# Patient Record
Sex: FEMALE | Race: OTHER
[De-identification: names, ages, dates, MRNs, and addresses within clinical notes are randomized per-mention and may not be internally consistent; named-entity substitution may affect disease eponyms.]

---

## 2018-11-28 ENCOUNTER — TRANSCRIPTION ENCOUNTER (OUTPATIENT)
Age: 38
End: 2018-11-28

## 2019-01-15 ENCOUNTER — TRANSCRIPTION ENCOUNTER (OUTPATIENT)
Age: 39
End: 2019-01-15

## 2022-06-07 PROBLEM — Z00.00 ENCOUNTER FOR PREVENTIVE HEALTH EXAMINATION: Status: ACTIVE | Noted: 2022-06-07

## 2022-06-23 ENCOUNTER — NON-APPOINTMENT (OUTPATIENT)
Age: 42
End: 2022-06-23

## 2022-06-23 ENCOUNTER — APPOINTMENT (OUTPATIENT)
Dept: HEART AND VASCULAR | Facility: CLINIC | Age: 42
End: 2022-06-23
Payer: COMMERCIAL

## 2022-06-23 DIAGNOSIS — J45.909 UNSPECIFIED ASTHMA, UNCOMPLICATED: ICD-10-CM

## 2022-06-23 DIAGNOSIS — K21.9 GASTRO-ESOPHAGEAL REFLUX DISEASE W/OUT ESOPHAGITIS: ICD-10-CM

## 2022-06-23 PROCEDURE — 93000 ELECTROCARDIOGRAM COMPLETE: CPT

## 2022-06-23 PROCEDURE — 99204 OFFICE O/P NEW MOD 45 MIN: CPT

## 2022-06-26 VITALS — SYSTOLIC BLOOD PRESSURE: 124 MMHG | RESPIRATION RATE: 16 BRPM | HEART RATE: 68 BPM | DIASTOLIC BLOOD PRESSURE: 84 MMHG

## 2022-06-26 PROBLEM — K21.9 GERD (GASTROESOPHAGEAL REFLUX DISEASE): Status: ACTIVE | Noted: 2022-06-26

## 2022-06-26 PROBLEM — J45.909 ASTHMA: Status: ACTIVE | Noted: 2022-06-26

## 2022-06-26 RX ORDER — ALBUTEROL SULFATE 5 MG/ML
SOLUTION, NON-ORAL INHALATION
Refills: 0 | Status: ACTIVE | COMMUNITY

## 2022-06-26 RX ORDER — DICLOFENAC SODIUM 100 MG/1
100 TABLET, FILM COATED, EXTENDED RELEASE ORAL
Refills: 0 | Status: ACTIVE | COMMUNITY

## 2022-06-26 NOTE — DISCUSSION/SUMMARY
[FreeTextEntry1] : EKG: NSR, TWI V1-4, no change from EKG dated 6/3/22\par \par Impression: Atypical chest pain - probably secondary to a radiculopathy given her spinal issues. Her abnormal EKG is of no clinical significance. No further cardiac work up or follow up indicated at this time.  She is to follow up with her PMD and neurologist.

## 2022-06-26 NOTE — REASON FOR VISIT
[Symptom and Test Evaluation] : symptom and test evaluation [FreeTextEntry1] : This 42 year old woman without prior cardiac history comes in c/o new onset constant chest pain and abnormal EKG. She reports constant, non-exertional SSCP. There is no pleuritic component and no change with palpation or position.  It has been persistent for 2 months. There is no associated SOB, nausea, palpitations or syncope. Her EKG at her PMD revealed TWI V1-4. She has had multiple orthopedic issues including spinal compression.

## 2022-07-29 ENCOUNTER — NON-APPOINTMENT (OUTPATIENT)
Age: 42
End: 2022-07-29

## 2022-08-09 ENCOUNTER — APPOINTMENT (OUTPATIENT)
Dept: HEART AND VASCULAR | Facility: CLINIC | Age: 42
End: 2022-08-09

## 2022-08-09 ENCOUNTER — NON-APPOINTMENT (OUTPATIENT)
Age: 42
End: 2022-08-09

## 2022-08-09 VITALS
BODY MASS INDEX: 26.68 KG/M2 | WEIGHT: 170 LBS | HEART RATE: 82 BPM | TEMPERATURE: 97.5 F | OXYGEN SATURATION: 98 % | DIASTOLIC BLOOD PRESSURE: 65 MMHG | RESPIRATION RATE: 16 BRPM | HEIGHT: 67 IN | SYSTOLIC BLOOD PRESSURE: 117 MMHG

## 2022-08-09 DIAGNOSIS — Z87.09 PERSONAL HISTORY OF OTHER DISEASES OF THE RESPIRATORY SYSTEM: ICD-10-CM

## 2022-08-09 DIAGNOSIS — Z82.49 FAMILY HISTORY OF ISCHEMIC HEART DISEASE AND OTHER DISEASES OF THE CIRCULATORY SYSTEM: ICD-10-CM

## 2022-08-09 DIAGNOSIS — R53.83 OTHER FATIGUE: ICD-10-CM

## 2022-08-09 DIAGNOSIS — G57.11 MERALGIA PARESTHETICA, RIGHT LOWER LIMB: ICD-10-CM

## 2022-08-09 DIAGNOSIS — Z86.79 PERSONAL HISTORY OF OTHER DISEASES OF THE CIRCULATORY SYSTEM: ICD-10-CM

## 2022-08-09 DIAGNOSIS — Z82.0 FAMILY HISTORY OF EPILEPSY AND OTHER DISEASES OF THE NERVOUS SYSTEM: ICD-10-CM

## 2022-08-09 DIAGNOSIS — Z86.69 PERSONAL HISTORY OF OTHER DISEASES OF THE NERVOUS SYSTEM AND SENSE ORGANS: ICD-10-CM

## 2022-08-09 DIAGNOSIS — Z86.32 PERSONAL HISTORY OF GESTATIONAL DIABETES: ICD-10-CM

## 2022-08-09 DIAGNOSIS — M51.26 OTHER INTERVERTEBRAL DISC DISPLACEMENT, LUMBAR REGION: ICD-10-CM

## 2022-08-09 PROCEDURE — 99215 OFFICE O/P EST HI 40 MIN: CPT | Mod: 25

## 2022-08-09 PROCEDURE — 93246 EXT ECG>7D<15D RECORDING: CPT

## 2022-08-09 PROCEDURE — 93000 ELECTROCARDIOGRAM COMPLETE: CPT

## 2022-08-09 PROCEDURE — 99205 OFFICE O/P NEW HI 60 MIN: CPT | Mod: 25

## 2022-08-10 ENCOUNTER — NON-APPOINTMENT (OUTPATIENT)
Age: 42
End: 2022-08-10

## 2022-08-10 PROBLEM — R53.83 OTHER FATIGUE: Status: ACTIVE | Noted: 2022-08-10

## 2022-08-10 NOTE — HISTORY OF PRESENT ILLNESS
[FreeTextEntry1] : Lynn Bolaños is a 41 yo female who presents for CV evaluation.  She has been seen by Dr Miguel Archuleta in the past.\par \par For two months, she has been experiencing an intermittent, deep, R and mid chest chest discomfort, lasting 10min to an hour, at least once a day, occurring in the am or pm, preceded by a brief sensation of palps.  She denies any associated symptoms of diaphoresis, n/v.  She has some fatigue and mild SOB.  She denies pnd, orthopnea, edema, or loc.\par \par She is active.  She is compliant with meds.\par \par ECG today reveals NSR, NS ST T changes, consider ischemia.\par \par Clinical hx reviewed in detail.\par \par Recommendations:\par 1. collect records from [facility], primary care\par 2. EXSE\par 3. CPET\par 4. ZIO\par 5. reassurance provided\par

## 2022-08-10 NOTE — DISCUSSION/SUMMARY
[ECG Normal Variant] : ECG normal variant [Myocardial Ischemia] : myocardial ischemia [Stable] : stable [Possible Cardiac Ischemia (Intermd Prob)] : possible cardiac ischemia (intermediate probability) [Non-Cardiac] : non-cardiac chest pain [Rhythm Disorder] : rhythm disorder [Deteriorating] : deteriorating [None] : There are no changes in medication management [Patient] : the patient [FreeTextEntry1] : fatigue [EKG obtained to assist in diagnosis and management of assessed problem(s)] : EKG obtained to assist in diagnosis and management of assessed problem(s)

## 2022-08-10 NOTE — REASON FOR VISIT
[Symptom and Test Evaluation] : symptom and test evaluation [Arrhythmia/ECG Abnorrmalities] : arrhythmia/ECG abnormalities [FreeTextEntry3] : Prem Walters

## 2022-08-22 PROCEDURE — 93248 EXT ECG>7D<15D REV&INTERPJ: CPT

## 2022-08-30 ENCOUNTER — APPOINTMENT (OUTPATIENT)
Dept: UROLOGY | Facility: CLINIC | Age: 42
End: 2022-08-30

## 2022-08-31 ENCOUNTER — NON-APPOINTMENT (OUTPATIENT)
Age: 42
End: 2022-08-31

## 2022-09-13 ENCOUNTER — APPOINTMENT (OUTPATIENT)
Dept: UROLOGY | Facility: CLINIC | Age: 42
End: 2022-09-13

## 2022-09-13 VITALS — TEMPERATURE: 97.7 F | HEART RATE: 90 BPM | SYSTOLIC BLOOD PRESSURE: 128 MMHG | DIASTOLIC BLOOD PRESSURE: 78 MMHG

## 2022-09-13 DIAGNOSIS — Z87.891 PERSONAL HISTORY OF NICOTINE DEPENDENCE: ICD-10-CM

## 2022-09-13 PROCEDURE — 99204 OFFICE O/P NEW MOD 45 MIN: CPT

## 2022-09-13 RX ORDER — PHENAZOPYRIDINE 200 MG/1
200 TABLET, FILM COATED ORAL
Qty: 20 | Refills: 0 | Status: ACTIVE | COMMUNITY
Start: 2022-09-13 | End: 1900-01-01

## 2022-09-13 RX ORDER — LIDOCAINE 5% 700 MG/1
5 PATCH TOPICAL
Refills: 0 | Status: ACTIVE | COMMUNITY

## 2022-09-14 ENCOUNTER — APPOINTMENT (OUTPATIENT)
Dept: HEART AND VASCULAR | Facility: CLINIC | Age: 42
End: 2022-09-14

## 2022-09-14 VITALS
HEIGHT: 66 IN | SYSTOLIC BLOOD PRESSURE: 100 MMHG | TEMPERATURE: 97.9 F | OXYGEN SATURATION: 99 % | WEIGHT: 176 LBS | DIASTOLIC BLOOD PRESSURE: 72 MMHG | BODY MASS INDEX: 28.28 KG/M2

## 2022-09-14 PROCEDURE — 94010 BREATHING CAPACITY TEST: CPT | Mod: 59

## 2022-09-14 PROCEDURE — 94621 CARDIOPULM EXERCISE TESTING: CPT

## 2022-09-15 LAB — BACTERIA UR CULT: NORMAL

## 2022-09-21 PROBLEM — Z87.891 FORMER SMOKER: Status: ACTIVE | Noted: 2022-06-26

## 2022-09-21 NOTE — ASSESSMENT
[FreeTextEntry1] : Patient is a 42 year female who presents with persistent UTI symptoms of urgency and burning with urination but not dysuria.  She has had 4 courses of antibiotics over the last 6 months but UC come back negative.   \par no other associated symptoms.  no modifying factors.\par she had hematuria work up 18 months ago with Dr. Elias 18 months ago with Renal US and cystoscopy which were normal \par CT Scan abd/pelvis with oral contrast in 2/21 no obvious  issues films reviewed by myself\par \par A/P\par 1. microhematuria\par 2. irritative voiding symptoms\par \par Pyridium 200 mg PO q 8 hours prn\par cystoscopy and pelvic exam\par > 45 minutes spent in review of data and with aptient

## 2022-09-21 NOTE — PHYSICAL EXAM
[General Appearance - Well Developed] : well developed [Normal Appearance] : normal appearance [General Appearance - In No Acute Distress] : no acute distress [Edema] : no peripheral edema [Respiration, Rhythm And Depth] : normal respiratory rhythm and effort [Abdomen Soft] : soft [Costovertebral Angle Tenderness] : no ~M costovertebral angle tenderness [Normal Station and Gait] : the gait and station were normal for the patient's age [Skin Color & Pigmentation] : normal skin color and pigmentation [] : no rash [Oriented To Time, Place, And Person] : oriented to person, place, and time

## 2022-10-11 ENCOUNTER — APPOINTMENT (OUTPATIENT)
Dept: HEART AND VASCULAR | Facility: CLINIC | Age: 42
End: 2022-10-11

## 2022-10-11 VITALS
BODY MASS INDEX: 28.28 KG/M2 | SYSTOLIC BLOOD PRESSURE: 102 MMHG | WEIGHT: 176 LBS | HEART RATE: 84 BPM | HEIGHT: 66 IN | OXYGEN SATURATION: 98 % | DIASTOLIC BLOOD PRESSURE: 66 MMHG

## 2022-10-11 PROCEDURE — 93325 DOPPLER ECHO COLOR FLOW MAPG: CPT

## 2022-10-11 PROCEDURE — 93320 DOPPLER ECHO COMPLETE: CPT

## 2022-10-11 PROCEDURE — 93351 STRESS TTE COMPLETE: CPT

## 2022-10-20 ENCOUNTER — NON-APPOINTMENT (OUTPATIENT)
Age: 42
End: 2022-10-20

## 2022-10-24 ENCOUNTER — RX RENEWAL (OUTPATIENT)
Age: 42
End: 2022-10-24

## 2022-10-24 ENCOUNTER — APPOINTMENT (OUTPATIENT)
Dept: UROLOGY | Facility: CLINIC | Age: 42
End: 2022-10-24

## 2022-10-24 DIAGNOSIS — N39.0 URINARY TRACT INFECTION, SITE NOT SPECIFIED: ICD-10-CM

## 2022-10-24 PROCEDURE — 99442: CPT

## 2022-11-01 PROBLEM — N39.0 RECURRENT UTI: Status: ACTIVE | Noted: 2022-09-13

## 2022-11-07 ENCOUNTER — APPOINTMENT (OUTPATIENT)
Dept: HEART AND VASCULAR | Facility: CLINIC | Age: 42
End: 2022-11-07

## 2022-11-07 VITALS
HEART RATE: 96 BPM | RESPIRATION RATE: 16 BRPM | DIASTOLIC BLOOD PRESSURE: 80 MMHG | TEMPERATURE: 97.2 F | SYSTOLIC BLOOD PRESSURE: 120 MMHG | HEIGHT: 66 IN | BODY MASS INDEX: 28.12 KG/M2 | OXYGEN SATURATION: 97 % | WEIGHT: 175 LBS

## 2022-11-07 PROCEDURE — 99215 OFFICE O/P EST HI 40 MIN: CPT

## 2022-11-08 RX ORDER — FLUTICASONE FUROATE 100 UG/1
100 POWDER RESPIRATORY (INHALATION)
Qty: 30 | Refills: 0 | Status: ACTIVE | COMMUNITY
Start: 2022-06-06

## 2022-11-08 RX ORDER — FLUTICASONE PROPIONATE AND SALMETEROL 50; 250 UG/1; UG/1
250-50 POWDER RESPIRATORY (INHALATION)
Qty: 60 | Refills: 0 | Status: ACTIVE | COMMUNITY
Start: 2022-08-15

## 2022-11-08 RX ORDER — FLUTICASONE PROPIONATE 110 UG/1
110 AEROSOL, METERED RESPIRATORY (INHALATION)
Qty: 12 | Refills: 0 | Status: ACTIVE | COMMUNITY
Start: 2022-06-03

## 2022-11-08 RX ORDER — FLASH GLUCOSE SENSOR
KIT MISCELLANEOUS
Qty: 2 | Refills: 0 | Status: ACTIVE | COMMUNITY
Start: 2022-10-02

## 2022-11-08 NOTE — HISTORY OF PRESENT ILLNESS
[FreeTextEntry1] : Lynn Bolaños returns for follow up.  \par \par She has been seen by Dr Miguel Archuleta in the past.\par \par For two months, she has been experiencing an intermittent, deep, R and mid chest chest discomfort, lasting 10min to an hour, at least once a day, occurring in the am or pm, preceded by a brief sensation of palps.  She denies any associated symptoms of diaphoresis, n/v.  She has some fatigue and mild SOB.  She denies pnd, orthopnea, edema, or loc.\par \par She is active.  She is compliant with meds.\par \par Telemetry 8/2022: APC/PVC\par EXSE 8/2022: nl lv sys fxn; nl ayers fxn; 9:35 min Alvaro; pos ECG; no ischemia\par CPET 9/2022: ischemic myocardial dysfxn; 69% predicted peak VO2\par \par Clinical hx and results reviewed in detail.\par \par Recommendations:\par 1. collect records from [facility] primary care ([facility] eval is ongoing)\par 2. exercise\par 3. verify meds\par 4. CTA\par 5. reassurance provided\par

## 2022-11-08 NOTE — DISCUSSION/SUMMARY
[ECG Normal Variant] : ECG normal variant [Myocardial Ischemia] : myocardial ischemia [Anginal Equivalent] : anginal equivalent [Non-Cardiac] : non-cardiac symptoms [Deteriorating] : deteriorating [Dietary Modification] : dietary modification [Exercise Regimen] : an exercise regimen [Weight Reduction] : weight reduction [PVCs] : ectopic ventricular beats [Stable] : stable [None] : There are no changes in medication management [Patient] : the patient [de-identified] : APC [FreeTextEntry1] : fatigue

## 2022-11-08 NOTE — REASON FOR VISIT
[Arrhythmia/ECG Abnorrmalities] : arrhythmia/ECG abnormalities [Coronary Artery Disease] : coronary artery disease [FreeTextEntry3] : Prem Walters

## 2022-11-21 ENCOUNTER — APPOINTMENT (OUTPATIENT)
Dept: UROLOGY | Facility: CLINIC | Age: 42
End: 2022-11-21

## 2022-11-21 VITALS
OXYGEN SATURATION: 97 % | HEART RATE: 96 BPM | RESPIRATION RATE: 18 BRPM | HEIGHT: 66 IN | BODY MASS INDEX: 28.12 KG/M2 | WEIGHT: 175 LBS

## 2022-11-21 DIAGNOSIS — N34.3 URETHRAL SYNDROME, UNSPECIFIED: ICD-10-CM

## 2022-11-21 DIAGNOSIS — R39.9 UNSPECIFIED SYMPTOMS AND SIGNS INVOLVING THE GENITOURINARY SYSTEM: ICD-10-CM

## 2022-11-21 PROCEDURE — 52000 CYSTOURETHROSCOPY: CPT

## 2022-11-21 PROCEDURE — 76857 US EXAM PELVIC LIMITED: CPT

## 2022-11-28 ENCOUNTER — RESULT REVIEW (OUTPATIENT)
Age: 42
End: 2022-11-28

## 2022-11-30 PROBLEM — R39.9 UTI SYMPTOMS: Status: ACTIVE | Noted: 2022-09-12

## 2022-11-30 PROBLEM — N34.3 URETHRAL SYNDROME: Status: ACTIVE | Noted: 2022-11-30

## 2022-12-01 ENCOUNTER — NON-APPOINTMENT (OUTPATIENT)
Age: 42
End: 2022-12-01

## 2022-12-05 RX ORDER — TAMSULOSIN HYDROCHLORIDE 0.4 MG/1
0.4 CAPSULE ORAL
Qty: 90 | Refills: 3 | Status: ACTIVE | COMMUNITY
Start: 2022-12-05 | End: 1900-01-01

## 2022-12-07 ENCOUNTER — APPOINTMENT (OUTPATIENT)
Dept: HEART AND VASCULAR | Facility: CLINIC | Age: 42
End: 2022-12-07
Payer: COMMERCIAL

## 2022-12-07 DIAGNOSIS — R07.89 OTHER CHEST PAIN: ICD-10-CM

## 2022-12-07 DIAGNOSIS — R94.31 ABNORMAL ELECTROCARDIOGRAM [ECG] [EKG]: ICD-10-CM

## 2022-12-07 DIAGNOSIS — R00.2 PALPITATIONS: ICD-10-CM

## 2022-12-07 DIAGNOSIS — R94.39 ABNORMAL RESULT OF OTHER CARDIOVASCULAR FUNCTION STUDY: ICD-10-CM

## 2022-12-07 DIAGNOSIS — I25.5 ISCHEMIC CARDIOMYOPATHY: ICD-10-CM

## 2022-12-07 PROCEDURE — 99215 OFFICE O/P EST HI 40 MIN: CPT | Mod: 95

## 2022-12-07 RX ORDER — NORTRIPTYLINE HYDROCHLORIDE 10 MG/1
10 CAPSULE ORAL
Qty: 90 | Refills: 0 | Status: DISCONTINUED | COMMUNITY
Start: 2022-08-04 | End: 2022-12-07

## 2022-12-07 RX ORDER — PRAMIPEXOLE DIHYDROCHLORIDE 0.25 MG/1
0.25 TABLET ORAL
Refills: 0 | Status: ACTIVE | COMMUNITY

## 2022-12-07 RX ORDER — METOPROLOL SUCCINATE 25 MG/1
25 TABLET, EXTENDED RELEASE ORAL
Qty: 2 | Refills: 0 | Status: DISCONTINUED | COMMUNITY
Start: 2022-10-21 | End: 2022-12-07

## 2022-12-07 RX ORDER — NITROFURANTOIN (MONOHYDRATE/MACROCRYSTALS) 25; 75 MG/1; MG/1
100 CAPSULE ORAL
Refills: 0 | Status: ACTIVE | COMMUNITY

## 2022-12-07 RX ORDER — METHYLPREDNISOLONE 32 MG/1
32 TABLET ORAL
Qty: 2 | Refills: 1 | Status: DISCONTINUED | COMMUNITY
Start: 2022-10-20 | End: 2022-12-07

## 2022-12-07 RX ORDER — CYCLOBENZAPRINE HYDROCHLORIDE 10 MG/1
10 TABLET, FILM COATED ORAL
Refills: 0 | Status: DISCONTINUED | COMMUNITY
End: 2022-12-07

## 2022-12-07 NOTE — DISCUSSION/SUMMARY
[ECG Normal Variant] : ECG normal variant [Myocardial Ischemia] : myocardial ischemia [Non-Cardiac] : non-cardiac symptoms [Dietary Modification] : dietary modification [Exercise Regimen] : an exercise regimen [Weight Reduction] : weight reduction [PVCs] : ectopic ventricular beats [Stable] : stable [None] : There are no changes in medication management [Patient] : the patient [de-identified] : APC

## 2022-12-07 NOTE — HISTORY OF PRESENT ILLNESS
[FreeTextEntry1] : Lynn Bolaños returns for follow up.  \par \par She has been seen by Dr Miguel Archuleta in the past.\par \par For two months, she has been experiencing an intermittent, deep, R and mid chest chest discomfort, lasting 10 min to an hour, at least once a day, occurring in the am or pm, preceded by a brief sensation of palps.  She denies any associated symptoms of diaphoresis, n/v.  She has some fatigue and mild SOB.  She denies pnd, orthopnea, edema, or loc.\par \par She is active.  She is compliant with meds.\par \par Telemetry 8/2022: APC/PVC\par EXSE 8/2022: nl lv sys fxn; nl ayers fxn; 9:35 min Alvaro; pos ECG; no ischemia\par CPET 9/2022: ischemic myocardial dysfxn; 69% predicted peak VO2\par CTA 11/2022: no sig CAD; shallow distal LAD bridge\par \par Clinical hx and results reviewed in detail.\par \par PE from 11/2022.\par \par Recommendations:\par 1. collect records from [facility] ([facility]), primary care, GI\par 2. exercise\par 3. reassurance provided\par 4. t/c musculoskeletal etiologies - t/c PT\par 5. f/u upon records review

## 2023-02-27 ENCOUNTER — APPOINTMENT (OUTPATIENT)
Dept: UROLOGY | Facility: CLINIC | Age: 43
End: 2023-02-27

## 2024-01-22 ENCOUNTER — APPOINTMENT (OUTPATIENT)
Dept: COLORECTAL SURGERY | Facility: CLINIC | Age: 44
End: 2024-01-22
Payer: COMMERCIAL

## 2024-01-22 VITALS
HEART RATE: 92 BPM | DIASTOLIC BLOOD PRESSURE: 89 MMHG | HEIGHT: 66 IN | BODY MASS INDEX: 28.12 KG/M2 | SYSTOLIC BLOOD PRESSURE: 126 MMHG | WEIGHT: 175 LBS | OXYGEN SATURATION: 96 %

## 2024-01-22 DIAGNOSIS — L29.0 PRURITUS ANI: ICD-10-CM

## 2024-01-22 DIAGNOSIS — K60.1 CHRONIC ANAL FISSURE: ICD-10-CM

## 2024-01-22 DIAGNOSIS — K62.89 OTHER SPECIFIED DISEASES OF ANUS AND RECTUM: ICD-10-CM

## 2024-01-22 DIAGNOSIS — K64.4 RESIDUAL HEMORRHOIDAL SKIN TAGS: ICD-10-CM

## 2024-01-22 PROCEDURE — 99203 OFFICE O/P NEW LOW 30 MIN: CPT

## 2024-01-22 NOTE — HISTORY OF PRESENT ILLNESS
[FreeTextEntry1] : Sary Bolaños is a 43-year-old female referred by Dr. Forde for evaluation and management of anorectal pain with bowel movements.  Sary reports onset of hemorrhoidal issues following the birth of her second child in 2017.  She has managed the external hemorrhoids including skin tags successfully during this time.  Several months ago she began to have sharp anal pain with bowel movements, continuing for a few hours after.  The pain has slowly worsened in intensity and duration.  She underwent colonoscopic evaluation with Dr. Galloway in 2021.  She is also under the care of physicians at [facility] for a pancreatic cyst.  She reports annual imaging without radiologic change.

## 2024-01-22 NOTE — CONSULT LETTER
[Dear  ___] : Dear  [unfilled], [Please see my note below.] : Please see my note below. [Consult Closing:] : Thank you very much for allowing me to participate in the care of this patient.  If you have any questions, please do not hesitate to contact me. [Sincerely,] : Sincerely, [FreeTextEntry2] : MD Bernabe Chávez MD [FreeTextEntry1] : Sary presents today for evaluation and management.  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  We are beginning treatment with nonsurgical management of the anal fissure.  Sary will return in a month to continue evaluation and potentially discuss surgical options. [FreeTextEntry3] : Walter Hill MD

## 2024-01-22 NOTE — ASSESSMENT
[FreeTextEntry1] : 43-year-old female presenting for management of a chronic symptomatic anal fissure and postpartum hemorrhoid anatomy that the patient is interested in possibly "cleaning up".  I discussed with Sary the focus on the anal fissure.  We will start with a nonsurgical treatment plan using compounded nifedipine ointment.  Instructions provided to patient.  I would like to see her back in 1 month.  At that time it may be appropriate to turn our focus to the hemorrhoid issues.  Given the chronic and self-reported external hemorrhoid skin tag issues, surgical management may be necessary.  I discussed with Sary my hope that we can get the fissure to heal without having to add that to any surgical plan.

## 2024-01-22 NOTE — PHYSICAL EXAM
[FreeTextEntry1] : Anorectal examination includes visual, digital rectal exam, anoscopic exam.  Visual exam reveals quiet external hemorrhoid skin tags right posterior greater than left lateral greater than right anterior.  Additionally there is visualized in the posterior midline anal canal a chronic appearing anal fissure.  Gentle Q-tip stimulation of the fissure elicits the sharp pain the patient is familiar with.  Due to the discomfort, anoscopic exam is deferred at today's visit.

## 2024-02-26 ENCOUNTER — APPOINTMENT (OUTPATIENT)
Dept: COLORECTAL SURGERY | Facility: CLINIC | Age: 44
End: 2024-02-26

## 2024-12-17 ENCOUNTER — APPOINTMENT (OUTPATIENT)
Dept: ORTHOPEDIC SURGERY | Facility: CLINIC | Age: 44
End: 2024-12-17
Payer: COMMERCIAL

## 2024-12-17 VITALS
OXYGEN SATURATION: 99 % | HEART RATE: 92 BPM | WEIGHT: 137 LBS | SYSTOLIC BLOOD PRESSURE: 135 MMHG | BODY MASS INDEX: 21.5 KG/M2 | DIASTOLIC BLOOD PRESSURE: 85 MMHG | HEIGHT: 67 IN

## 2024-12-17 DIAGNOSIS — Z00.00 ENCOUNTER FOR GENERAL ADULT MEDICAL EXAMINATION W/OUT ABNORMAL FINDINGS: ICD-10-CM

## 2024-12-17 PROCEDURE — 99204 OFFICE O/P NEW MOD 45 MIN: CPT | Mod: 25

## 2024-12-17 PROCEDURE — 73030 X-RAY EXAM OF SHOULDER: CPT | Mod: RT

## 2024-12-17 PROCEDURE — J3490M: CUSTOM

## 2024-12-17 PROCEDURE — 20610 DRAIN/INJ JOINT/BURSA W/O US: CPT | Mod: LT

## 2024-12-18 RX ORDER — TRAMADOL HYDROCHLORIDE 50 MG/1
50 TABLET, COATED ORAL
Qty: 21 | Refills: 0 | Status: ACTIVE | COMMUNITY
Start: 2024-12-18 | End: 1900-01-01

## 2024-12-24 ENCOUNTER — APPOINTMENT (OUTPATIENT)
Dept: ORTHOPEDIC SURGERY | Facility: CLINIC | Age: 44
End: 2024-12-24
Payer: COMMERCIAL

## 2024-12-24 VITALS
BODY MASS INDEX: 21.5 KG/M2 | OXYGEN SATURATION: 98 % | SYSTOLIC BLOOD PRESSURE: 117 MMHG | WEIGHT: 137 LBS | HEART RATE: 59 BPM | HEIGHT: 67 IN | DIASTOLIC BLOOD PRESSURE: 77 MMHG

## 2024-12-24 DIAGNOSIS — M67.912 UNSPECIFIED DISORDER OF SYNOVIUM AND TENDON, LEFT SHOULDER: ICD-10-CM

## 2024-12-24 DIAGNOSIS — R29.898 OTHER SYMPTOMS AND SIGNS INVOLVING THE MUSCULOSKELETAL SYSTEM: ICD-10-CM

## 2024-12-24 DIAGNOSIS — M75.02 ADHESIVE CAPSULITIS OF LEFT SHOULDER: ICD-10-CM

## 2024-12-24 PROCEDURE — 99214 OFFICE O/P EST MOD 30 MIN: CPT | Mod: 25

## 2024-12-24 PROCEDURE — 20610 DRAIN/INJ JOINT/BURSA W/O US: CPT | Mod: LT

## 2024-12-24 PROCEDURE — J3490M: CUSTOM

## 2024-12-24 RX ORDER — NAPROXEN 500 MG/1
500 TABLET ORAL
Qty: 60 | Refills: 0 | Status: ACTIVE | COMMUNITY
Start: 2024-12-24 | End: 1900-01-01

## 2025-01-14 ENCOUNTER — APPOINTMENT (OUTPATIENT)
Dept: ORTHOPEDIC SURGERY | Facility: CLINIC | Age: 45
End: 2025-01-14